# Patient Record
Sex: FEMALE | Race: WHITE | NOT HISPANIC OR LATINO | Employment: UNEMPLOYED
[De-identification: names, ages, dates, MRNs, and addresses within clinical notes are randomized per-mention and may not be internally consistent; named-entity substitution may affect disease eponyms.]

---

## 2023-09-19 ENCOUNTER — APPOINTMENT (OUTPATIENT)
Dept: MEDICAL GROUP | Facility: CLINIC | Age: 33
End: 2023-09-19
Payer: MEDICAID

## 2023-10-09 ENCOUNTER — OFFICE VISIT (OUTPATIENT)
Dept: MEDICAL GROUP | Facility: CLINIC | Age: 33
End: 2023-10-09
Payer: MEDICAID

## 2023-10-09 VITALS
BODY MASS INDEX: 23.95 KG/M2 | WEIGHT: 149 LBS | OXYGEN SATURATION: 95 % | DIASTOLIC BLOOD PRESSURE: 68 MMHG | SYSTOLIC BLOOD PRESSURE: 108 MMHG | TEMPERATURE: 98.2 F | HEART RATE: 109 BPM | HEIGHT: 66 IN

## 2023-10-09 DIAGNOSIS — R10.2 VAGINAL PAIN: ICD-10-CM

## 2023-10-09 DIAGNOSIS — Z76.89 ENCOUNTER TO ESTABLISH CARE: ICD-10-CM

## 2023-10-09 DIAGNOSIS — B00.9 HSV (HERPES SIMPLEX VIRUS) INFECTION: ICD-10-CM

## 2023-10-09 PROCEDURE — 3074F SYST BP LT 130 MM HG: CPT | Mod: GC

## 2023-10-09 PROCEDURE — 3078F DIAST BP <80 MM HG: CPT | Mod: GC

## 2023-10-09 PROCEDURE — 99214 OFFICE O/P EST MOD 30 MIN: CPT | Mod: GC

## 2023-10-09 RX ORDER — ACYCLOVIR 400 MG/1
400 TABLET ORAL 2 TIMES DAILY
COMMUNITY
End: 2023-10-09 | Stop reason: SDUPTHER

## 2023-10-09 RX ORDER — ACYCLOVIR 400 MG/1
400 TABLET ORAL 2 TIMES DAILY
Qty: 90 TABLET | Refills: 3 | Status: SHIPPED | OUTPATIENT
Start: 2023-10-09

## 2023-10-09 RX ORDER — PNV NO.95/FERROUS FUM/FOLIC AC 28MG-0.8MG
TABLET ORAL
COMMUNITY
End: 2023-10-09 | Stop reason: SDUPTHER

## 2023-10-09 RX ORDER — ERGOCALCIFEROL 1.25 MG/1
CAPSULE ORAL
COMMUNITY

## 2023-10-09 RX ORDER — PNV NO.95/FERROUS FUM/FOLIC AC 28MG-0.8MG
1 TABLET ORAL DAILY
Qty: 90 TABLET | Refills: 1 | Status: SHIPPED | OUTPATIENT
Start: 2023-10-09

## 2023-10-09 ASSESSMENT — PATIENT HEALTH QUESTIONNAIRE - PHQ9: CLINICAL INTERPRETATION OF PHQ2 SCORE: 0

## 2023-10-09 NOTE — ASSESSMENT & PLAN NOTE
Reports a bulge in her anterior vagina that is painful when sitting. After pelvic exam preformed, is unlikely a cystocele as it does not move or enlarge on valsalva. Will refer to OB/GYN.

## 2023-10-09 NOTE — PROGRESS NOTES
CC:  establish care    HISTORY OF THE PRESENT ILLNESS: Patient is a 33 y.o. female. This pleasant patient is here today to establish care. Pt reports a bulge in the anterior wall of her vagina that she noticed 2 weeks ago. She has a moderate amount of vaginal pain when she sits down. Denies changes with baring down.  Pt had a  3.5 months ago with complication of manual extraction of placenta with D&C and required IV antibiotics for 2 days. Otherwise has been doing well since then. Denies any vaginal discharge. Has not resumed menstrual cycle. She is breast feeding. Pt recently moved to [place] with her wife and 2 children.     Patient takes a prenatal vitamin, vitamin D, as well as acyclovir.     Allergies: Patient has no allergy information on record.    Current Outpatient Medications Ordered in Epic   Medication Sig Dispense Refill    vitamin D2, Ergocalciferol, (DRISDOL) 1.25 MG (81770 UT) Cap capsule Take  by mouth every 7 days.      Prenatal Vit-Fe Fumarate-FA (PRENATAL VITAMINS) 28-0.8 MG Tab Take 1 Tablet by mouth every day. 90 Tablet 1    acyclovir (ZOVIRAX) 400 MG tablet Take 1 Tablet by mouth 2 times a day. 90 Tablet 3     No current Epic-ordered facility-administered medications on file.       History reviewed. No pertinent past medical history.      Past Surgical History:   Procedure Laterality Date    OVARIAN CYSTECTOMY      TONSILLECTOMY         Social History     Tobacco Use    Smoking status: Never    Smokeless tobacco: Never   Substance Use Topics    Alcohol use: Yes     Comment: 5-6 drinks per week    Drug use: Not Currently       Social History     Social History Narrative    Not on file       Family History   Problem Relation Age of Onset    Hypertension Mother     Diabetes Mother     Hypertension Father        ROS:     - Constitutional:Negative for fever, chills, unexpected weight change, and fatigue/generalized weakness.     - HEENT: Negative for vision changes, hearing changes, ear pain, ear  "discharge, rhinorrhea, sinus congestion, sore throat    - Respiratory: Negative for cough, wheezing, and crackles.      - Cardiovascular: Negative for chest pain.    - Gastrointestinal: Negative for heartburn, nausea, vomiting, abdominal pain, hematochezia.    - Genitourinary: Negative for dysuria, polyuria.    - Musculoskeletal:Negative for myalgias, back pain.    - Skin:Negative for rash, itching.    - Neurological: Negative for dizziness, focal weakness.    - Endo/Heme/Allergies: Does not bruise/bleed easily.     - Psychiatric/Behavioral: Negative for depression, suicidal/homicidal ideation.        Exam: /68 (BP Location: Left arm, Patient Position: Sitting, BP Cuff Size: Adult)   Pulse (!) 109   Temp 36.8 °C (98.2 °F) (Temporal)   Ht 1.676 m (5' 6\")   Wt 67.6 kg (149 lb)   SpO2 95%  Body mass index is 24.05 kg/m².    General: Normal appearing. No distress.  HEENT: Normocephalic. Eyes conjunctiva clear lids without ptosis, pupils equal and reactive to light accommodation, ears normal shape and contour, canals are clear bilaterally, tympanic membranes are benign, nasal mucosa benign, oropharynx is without erythema, edema or exudates.   Neck: Thyroid is not enlarged.  Pulmonary: Clear to ausculation.  Normal effort. No rales, ronchi, or wheezing.  Cardiovascular: Regular rate and rhythm without murmur.   Abdomen: Soft, nontender, nondistended. Normal bowel sounds.  : Tissue bulge at 12 o'clock position that was firm and does not move or enlarge with valsalva. Cervix and vaginal wall otherwise without abnormal findings.    Neurologic: Grossly nonfocal  Lymph: No cervical, supraclavicular or axillary lymph nodes are palpable  Skin: Warm and dry.  No obvious lesions.  Musculoskeletal: Normal gait.  Psych: Normal mood and affect. Alert and oriented x3. Judgment and insight is normal.    Assessment/Plan    33 y.o. female with the following-  Problem List Items Addressed This Visit       Encounter to " establish care     Pt is breastfeeding currently, refilled prenatal vitamin. Pt is UTD with flu vaccine.          Relevant Medications    Prenatal Vit-Fe Fumarate-FA (PRENATAL VITAMINS) 28-0.8 MG Tab    Vaginal pain     Reports a bulge in her anterior vagina that is painful when sitting. After pelvic exam preformed, is unlikely a cystocele as it does not move or enlarge on valsalva. Will refer to OB/GYN.          Relevant Orders    Referral to OB/Gyn    HSV (herpes simplex virus) infection     Refilled acyclovir that she takes for prophylaxis          Relevant Medications    acyclovir (ZOVIRAX) 400 MG tablet     Return in about 1 year (around 10/9/2024) for annual wellness or sooner if acute concerns arise .

## 2024-01-09 ENCOUNTER — HOSPITAL ENCOUNTER (OUTPATIENT)
Facility: MEDICAL CENTER | Age: 34
End: 2024-01-09
Attending: OBSTETRICS & GYNECOLOGY
Payer: MEDICAID

## 2024-01-09 ENCOUNTER — GYNECOLOGY VISIT (OUTPATIENT)
Dept: OBGYN | Facility: CLINIC | Age: 34
End: 2024-01-09
Payer: MEDICAID

## 2024-01-09 VITALS — BODY MASS INDEX: 24.21 KG/M2 | WEIGHT: 150 LBS

## 2024-01-09 DIAGNOSIS — Z12.4 SCREENING FOR CERVICAL CANCER: ICD-10-CM

## 2024-01-09 DIAGNOSIS — R10.2 VAGINAL PAIN: ICD-10-CM

## 2024-01-09 DIAGNOSIS — N89.8 VAGINAL DRYNESS: ICD-10-CM

## 2024-01-09 DIAGNOSIS — Z11.59 NEED FOR HEPATITIS B SCREENING TEST: ICD-10-CM

## 2024-01-09 DIAGNOSIS — N81.10 CYSTOCELE WITHOUT UTERINE PROLAPSE: ICD-10-CM

## 2024-01-09 PROCEDURE — 87660 TRICHOMONAS VAGIN DIR PROBE: CPT

## 2024-01-09 PROCEDURE — 87491 CHLMYD TRACH DNA AMP PROBE: CPT

## 2024-01-09 PROCEDURE — 99203 OFFICE O/P NEW LOW 30 MIN: CPT | Performed by: OBSTETRICS & GYNECOLOGY

## 2024-01-09 PROCEDURE — 87480 CANDIDA DNA DIR PROBE: CPT

## 2024-01-09 PROCEDURE — 88175 CYTOPATH C/V AUTO FLUID REDO: CPT

## 2024-01-09 PROCEDURE — 87510 GARDNER VAG DNA DIR PROBE: CPT

## 2024-01-09 PROCEDURE — 87591 N.GONORRHOEAE DNA AMP PROB: CPT

## 2024-01-09 PROCEDURE — 87624 HPV HI-RISK TYP POOLED RSLT: CPT

## 2024-01-09 RX ORDER — ESTRADIOL 0.5 MG/1
0.5 TABLET ORAL
Qty: 20 TABLET | Refills: 2 | Status: SHIPPED | OUTPATIENT
Start: 2024-01-09 | End: 2024-01-18

## 2024-01-09 ASSESSMENT — ENCOUNTER SYMPTOMS
MUSCULOSKELETAL NEGATIVE: 1
GASTROINTESTINAL NEGATIVE: 1
RESPIRATORY NEGATIVE: 1
NEUROLOGICAL NEGATIVE: 1
PSYCHIATRIC NEGATIVE: 1
CONSTITUTIONAL NEGATIVE: 1
CARDIOVASCULAR NEGATIVE: 1
EYES NEGATIVE: 1

## 2024-01-09 NOTE — PROGRESS NOTES
GYN PROBLEM VISIT    CC:  Gynecologic Exam    HPI: Patient is a 33 y.o.  who complains of of anterior vaginal wall bulge. She had a vaginal delivery about 6 months ago. She is still breast feeding. She reports that the notices some discomfort due to the bulge, but it's not particularly painful. It's worse towards the end of the day. She doesn't noticed anything else coming out of her vagina. She does not having difficulty with bowel movements or urination.      ROS:   Review of Systems   Constitutional: Negative.    HENT: Negative.     Eyes: Negative.    Respiratory: Negative.     Cardiovascular: Negative.    Gastrointestinal: Negative.    Genitourinary:         Vaginal bulge    Musculoskeletal: Negative.    Skin: Negative.    Neurological: Negative.    Endo/Heme/Allergies: Negative.    Psychiatric/Behavioral: Negative.           PFSH:  I personally reviewed the past medical and surgical histories.     Social History     Tobacco Use    Smoking status: Never    Smokeless tobacco: Never   Substance Use Topics    Alcohol use: Yes     Comment: 5-6 drinks per week    Drug use: Not Currently       Social History     Substance and Sexual Activity   Sexual Activity Yes    Partners: Female        ALLERGIES / REACTIONS:  Not on File                        PHYSICAL EXAMINATION:  Vital Signs:   Vitals:    24 0812   Weight: 150 lb     Body mass index is 24.21 kg/m².    Physical Exam  Vitals reviewed. Exam conducted with a chaperone present.   Constitutional:       Appearance: Normal appearance.   HENT:      Head: Normocephalic.   Eyes:      Extraocular Movements: Extraocular movements intact.      Pupils: Pupils are equal, round, and reactive to light.   Cardiovascular:      Rate and Rhythm: Normal rate.   Pulmonary:      Effort: Pulmonary effort is normal.   Abdominal:      General: Abdomen is flat.      Palpations: Abdomen is soft.   Genitourinary:     General: Normal vulva.      Exam position: Lithotomy position.       Vagina: Prolapsed vaginal walls present.      Cervix: Normal.      Uterus: Normal.       Adnexa: Right adnexa normal and left adnexa normal.      Comments: Pelvic exam performed. A stage 2 cystocele is noted with valsalva. The apex and the posterior wall of the vagina appear well support. Cervix is normal. Pap smear was performed   Musculoskeletal:         General: Normal range of motion.      Cervical back: Normal range of motion.   Skin:     General: Skin is warm and dry.   Neurological:      General: No focal deficit present.      Mental Status: She is alert and oriented to person, place, and time.   Psychiatric:         Mood and Affect: Mood normal.         Behavior: Behavior normal.          ASSESSMENT AND PLAN:  33 y.o.  who presents for     1. Screening for cervical cancer  - THINPREP PAP W/HPV AND CTNG; Future    2. Vaginal pain  - VAGINAL PATHOGENS DNA PANEL; Future    3. Cystocele without uterine prolapse  - estradiol (ESTRACE) 0.5 MG tablet; Take 1 Tablet by mouth two times a week.  Dispense: 20 Tablet; Refill: 2  - PT Eval and Treat    4. Need for hepatitis B screening test  - HEP B SURFACE AB; Future  - HEP B SURFACE ANTIGEN; Future    5. Vaginal dryness  - PT Eval and Treat    Plan:  Extensive counseling regarding pelvic organ prolapse was provided. We discussed that this is not pathological and may improve as she gets further from delivery. We also discussed that breastfeeding creates a hypoestrogen environment and this can thin the vaginal walls.   Vaginal estrogen was prescribed. Her insurance did not cover premarin so estrace tabs were prescribed and she was instructed to use one tab twice weekly vaginally  We discussed pelvic floor PT and a referral was placed  Based on exam I don't feel like a pessary is indicated or would be helpful for her  We discussed pessary vs surgery if the prolapse worsens and I discussed with her that I wouldn't recommend surgery until she is done  childbearing. I discussed that I don't think either are indicated at that time   Return to clinic as needed or for annual exam   She is in a same sex relationship and is not currently at risk for pregnancy    Ronald Ivey M.D.  Obstetrics & Gynecology   [phone]  8:27 AM

## 2024-01-09 NOTE — PROGRESS NOTES
NP here today for GYN appt  Phone # [phone] (home)   C/o vaginal discomfort as well as newly discovered bulge

## 2024-01-11 ENCOUNTER — TELEPHONE (OUTPATIENT)
Dept: OBGYN | Facility: CLINIC | Age: 34
End: 2024-01-11
Payer: MEDICAID

## 2024-01-11 NOTE — TELEPHONE ENCOUNTER
[facility] called and said they have a lab error and lost the pathogens swab for the patient. Pt will need a re swab?

## 2024-01-18 DIAGNOSIS — N89.8 VAGINAL DRYNESS: ICD-10-CM

## 2024-01-18 LAB
C TRACH RRNA CVX QL NAA+PROBE: NEGATIVE
CYTOLOGIST CVX/VAG CYTO: NORMAL
CYTOLOGY CVX/VAG DOC CYTO: NORMAL
CYTOLOGY CVX/VAG DOC THIN PREP: NORMAL
HPV I/H RISK 4 DNA CVX QL PROBE+SIG AMP: NEGATIVE
N GONORRHOEA RRNA CVX QL NAA+PROBE: NEGATIVE
NOTE NL11727A: NORMAL
OTHER STN SPEC: NORMAL
STAT OF ADQ CVX/VAG CYTO-IMP: NORMAL

## 2024-01-18 RX ORDER — ESTRADIOL 10 UG/1
10 INSERT VAGINAL
Qty: 8 TABLET | Refills: 5 | Status: SHIPPED | OUTPATIENT
Start: 2024-01-18

## 2024-01-26 DIAGNOSIS — Z11.59 NEED FOR HEPATITIS B SCREENING TEST: ICD-10-CM

## 2024-02-05 DIAGNOSIS — Z11.59 NEED FOR HEPATITIS B SCREENING TEST: ICD-10-CM

## 2024-02-07 DIAGNOSIS — Z11.59 NEED FOR HEPATITIS B SCREENING TEST: ICD-10-CM

## 2024-02-20 DIAGNOSIS — N81.89 PELVIC FLOOR WEAKNESS: ICD-10-CM

## 2024-02-23 NOTE — TELEPHONE ENCOUNTER
Called pt and left a message to call back. If patient calls back please schedule a re swab thank you.

## 2024-03-25 ENCOUNTER — HOSPITAL ENCOUNTER (OUTPATIENT)
Dept: LAB | Facility: MEDICAL CENTER | Age: 34
End: 2024-03-25
Payer: MEDICAID

## 2024-03-25 DIAGNOSIS — Z11.1 SCREENING FOR TUBERCULOSIS: ICD-10-CM

## 2024-03-25 PROCEDURE — 36415 COLL VENOUS BLD VENIPUNCTURE: CPT

## 2024-03-25 PROCEDURE — 86480 TB TEST CELL IMMUN MEASURE: CPT

## 2024-03-26 LAB
GAMMA INTERFERON BACKGROUND BLD IA-ACNC: 0.06 IU/ML
M TB IFN-G BLD-IMP: NEGATIVE
M TB IFN-G CD4+ BCKGRND COR BLD-ACNC: -0.01 IU/ML
MITOGEN IGNF BCKGRD COR BLD-ACNC: >10 IU/ML
QFT TB2 - NIL TBQ2: 0 IU/ML

## 2024-04-11 DIAGNOSIS — B00.9 HSV (HERPES SIMPLEX VIRUS) INFECTION: ICD-10-CM

## 2024-04-11 NOTE — TELEPHONE ENCOUNTER
Received request via: Pharmacy    Was the patient seen in the last year in this department? Yes    Does the patient have an active prescription (recently filled or refills available) for medication(s) requested? No    Pharmacy Name: [facility]

## 2024-04-13 RX ORDER — ACYCLOVIR 400 MG/1
400 TABLET ORAL 2 TIMES DAILY
Qty: 90 TABLET | Refills: 3 | Status: SHIPPED | OUTPATIENT
Start: 2024-04-13

## 2024-04-21 DIAGNOSIS — Z76.89 ENCOUNTER TO ESTABLISH CARE: ICD-10-CM

## 2024-04-22 NOTE — TELEPHONE ENCOUNTER
Received request via: Pharmacy    Was the patient seen in the last year in this department? Yes    Does the patient have an active prescription (recently filled or refills available) for medication(s) requested? No    Pharmacy Name: [facility]    Does the patient have intermediate Plus and need 100 day supply (blood pressure, diabetes and cholesterol meds only)? Patient does not have SCP

## 2024-04-23 RX ORDER — PNV NO.95/FERROUS FUM/FOLIC AC 28MG-0.8MG
1 TABLET ORAL DAILY
Qty: 90 TABLET | Refills: 1 | Status: SHIPPED | OUTPATIENT
Start: 2024-04-23

## 2024-05-09 ENCOUNTER — TELEPHONE (OUTPATIENT)
Dept: OBGYN | Facility: CLINIC | Age: 34
End: 2024-05-09
Payer: MEDICAID

## 2024-05-09 NOTE — TELEPHONE ENCOUNTER
[facility] called needing a referral for them. Didn't see one in referral  but on DR Ivey note on 01-09-24 she was going to place one. Told physical therapy  we will fax it # [phone]

## 2024-05-10 ENCOUNTER — TELEPHONE (OUTPATIENT)
Dept: OBGYN | Facility: CLINIC | Age: 34
End: 2024-05-10
Payer: MEDICAID

## 2024-05-10 DIAGNOSIS — N81.89 PELVIC FLOOR WEAKNESS: ICD-10-CM

## 2024-05-10 NOTE — TELEPHONE ENCOUNTER
Called pt 2 times no answer,left a voice to call us back. I'm send her a message on [email or web address] about her referral being sent.

## 2024-07-18 DIAGNOSIS — N89.8 VAGINAL DRYNESS: ICD-10-CM

## 2024-07-18 RX ORDER — ESTRADIOL 10 UG/1
TABLET VAGINAL
Qty: 8 TABLET | Refills: 5 | Status: SHIPPED | OUTPATIENT
Start: 2024-07-18

## 2024-09-13 DIAGNOSIS — Z76.89 ENCOUNTER TO ESTABLISH CARE: ICD-10-CM

## 2024-09-13 RX ORDER — PNV NO.95/FERROUS FUM/FOLIC AC 28MG-0.8MG
1 TABLET ORAL DAILY
Qty: 90 TABLET | Refills: 1 | Status: SHIPPED | OUTPATIENT
Start: 2024-09-13

## 2024-09-13 NOTE — TELEPHONE ENCOUNTER
Received request via: Pharmacy    Was the patient seen in the last year in this department? Yes    Does the patient have an active prescription (recently filled or refills available) for medication(s) requested? No    Pharmacy Name: [facility]    Does the patient have skilled nursing Plus and need 100-day supply? (This applies to ALL medications) Patient does not have SCP

## 2024-09-23 DIAGNOSIS — B00.9 HSV (HERPES SIMPLEX VIRUS) INFECTION: ICD-10-CM

## 2024-09-23 RX ORDER — ACYCLOVIR 400 MG/1
400 TABLET ORAL 2 TIMES DAILY
Qty: 90 TABLET | Refills: 3 | Status: SHIPPED | OUTPATIENT
Start: 2024-09-23

## 2024-09-23 NOTE — TELEPHONE ENCOUNTER
Received request via: Pharmacy    Was the patient seen in the last year in this department? Yes    Does the patient have an active prescription (recently filled or refills available) for medication(s) requested? No    Pharmacy Name: [facility]    Does the patient have penitentiary Plus and need 100-day supply? (This applies to ALL medications) Patient does not have SCP

## 2024-09-25 DIAGNOSIS — B00.9 HSV (HERPES SIMPLEX VIRUS) INFECTION: ICD-10-CM

## 2024-10-18 ENCOUNTER — OFFICE VISIT (OUTPATIENT)
Dept: MEDICAL GROUP | Facility: CLINIC | Age: 34
End: 2024-10-18
Payer: MEDICAID

## 2024-10-18 VITALS
OXYGEN SATURATION: 97 % | HEIGHT: 66 IN | SYSTOLIC BLOOD PRESSURE: 100 MMHG | DIASTOLIC BLOOD PRESSURE: 66 MMHG | RESPIRATION RATE: 16 BRPM | TEMPERATURE: 98 F | WEIGHT: 140 LBS | HEART RATE: 86 BPM | BODY MASS INDEX: 22.5 KG/M2

## 2024-10-18 DIAGNOSIS — B00.9 HSV (HERPES SIMPLEX VIRUS) INFECTION: ICD-10-CM

## 2024-10-18 PROCEDURE — 3074F SYST BP LT 130 MM HG: CPT | Mod: GC

## 2024-10-18 PROCEDURE — 99213 OFFICE O/P EST LOW 20 MIN: CPT | Mod: GE

## 2024-10-18 PROCEDURE — 3078F DIAST BP <80 MM HG: CPT | Mod: GC

## 2024-10-18 RX ORDER — ACYCLOVIR 400 MG/1
400 TABLET ORAL 2 TIMES DAILY
Qty: 60 TABLET | Refills: 11 | Status: SHIPPED | OUTPATIENT
Start: 2024-10-18 | End: 2024-11-17

## 2024-10-18 ASSESSMENT — PATIENT HEALTH QUESTIONNAIRE - PHQ9: CLINICAL INTERPRETATION OF PHQ2 SCORE: 0

## 2025-03-21 ENCOUNTER — HOSPITAL ENCOUNTER (OUTPATIENT)
Dept: LAB | Facility: MEDICAL CENTER | Age: 35
End: 2025-03-21
Payer: MEDICAID

## 2025-03-21 DIAGNOSIS — Z11.1 TUBERCULOSIS SCREENING: ICD-10-CM

## 2025-03-21 PROCEDURE — 86480 TB TEST CELL IMMUN MEASURE: CPT

## 2025-03-21 PROCEDURE — 36415 COLL VENOUS BLD VENIPUNCTURE: CPT

## 2025-03-23 LAB
GAMMA INTERFERON BACKGROUND BLD IA-ACNC: 0.09 IU/ML
M TB IFN-G BLD-IMP: NEGATIVE
M TB IFN-G CD4+ BCKGRND COR BLD-ACNC: 0.05 IU/ML
MITOGEN IGNF BCKGRD COR BLD-ACNC: >10 IU/ML
QFT TB2 - NIL TBQ2: 0.04 IU/ML

## 2025-06-06 ENCOUNTER — NON-PROVIDER VISIT (OUTPATIENT)
Dept: OCCUPATIONAL MEDICINE | Facility: CLINIC | Age: 35
End: 2025-06-06

## 2025-06-06 DIAGNOSIS — Z02.89 ENCOUNTER FOR OCCUPATIONAL HEALTH EXAMINATION INVOLVING RESPIRATOR: Primary | ICD-10-CM

## 2025-06-06 PROCEDURE — 94375 RESPIRATORY FLOW VOLUME LOOP: CPT | Performed by: PREVENTIVE MEDICINE
